# Patient Record
Sex: FEMALE | Race: BLACK OR AFRICAN AMERICAN | Employment: OTHER | URBAN - METROPOLITAN AREA
[De-identification: names, ages, dates, MRNs, and addresses within clinical notes are randomized per-mention and may not be internally consistent; named-entity substitution may affect disease eponyms.]

---

## 2020-11-05 ENCOUNTER — HOSPITAL ENCOUNTER (OUTPATIENT)
Dept: PREADMISSION TESTING | Age: 65
Discharge: HOME | End: 2020-11-05
Payer: MEDICARE

## 2020-11-05 PROCEDURE — 87635 SARS-COV-2 COVID-19 AMP PRB: CPT

## 2020-11-06 LAB — SARS-COV-2, COV2NT: NOT DETECTED

## 2020-11-10 ENCOUNTER — HOSPITAL ENCOUNTER (OUTPATIENT)
Age: 65
Setting detail: OUTPATIENT SURGERY
Discharge: HOME | End: 2020-11-10
Attending: INTERNAL MEDICINE | Admitting: INTERNAL MEDICINE
Payer: MEDICARE

## 2020-11-10 VITALS
BODY MASS INDEX: 29.53 KG/M2 | WEIGHT: 173 LBS | OXYGEN SATURATION: 99 % | SYSTOLIC BLOOD PRESSURE: 134 MMHG | TEMPERATURE: 97.3 F | RESPIRATION RATE: 16 BRPM | DIASTOLIC BLOOD PRESSURE: 64 MMHG | HEIGHT: 64 IN | HEART RATE: 76 BPM

## 2020-11-10 LAB — GLUCOSE BLD STRIP.AUTO-MCNC: 75 MG/DL (ref 70–110)

## 2020-11-10 PROCEDURE — 77030040361 HC SLV COMPR DVT MDII -B: Performed by: INTERNAL MEDICINE

## 2020-11-10 PROCEDURE — 82962 GLUCOSE BLOOD TEST: CPT

## 2020-11-10 PROCEDURE — G0500 MOD SEDAT ENDO SERVICE >5YRS: HCPCS | Performed by: INTERNAL MEDICINE

## 2020-11-10 PROCEDURE — 74011250636 HC RX REV CODE- 250/636: Performed by: INTERNAL MEDICINE

## 2020-11-10 PROCEDURE — 76040000007: Performed by: INTERNAL MEDICINE

## 2020-11-10 PROCEDURE — 2709999900 HC NON-CHARGEABLE SUPPLY: Performed by: INTERNAL MEDICINE

## 2020-11-10 RX ORDER — SIMVASTATIN 10 MG/1
TABLET, FILM COATED ORAL
COMMUNITY

## 2020-11-10 RX ORDER — ATROPINE SULFATE 0.1 MG/ML
0.5 INJECTION INTRAVENOUS
Status: CANCELLED | OUTPATIENT
Start: 2020-11-10 | End: 2020-11-11

## 2020-11-10 RX ORDER — FENTANYL CITRATE 50 UG/ML
100 INJECTION, SOLUTION INTRAMUSCULAR; INTRAVENOUS
Status: DISCONTINUED | OUTPATIENT
Start: 2020-11-10 | End: 2020-11-10 | Stop reason: HOSPADM

## 2020-11-10 RX ORDER — MIDAZOLAM HYDROCHLORIDE 1 MG/ML
.25-5 INJECTION, SOLUTION INTRAMUSCULAR; INTRAVENOUS
Status: DISCONTINUED | OUTPATIENT
Start: 2020-11-10 | End: 2020-11-10 | Stop reason: HOSPADM

## 2020-11-10 RX ORDER — METOPROLOL TARTRATE 50 MG/1
50 TABLET ORAL 2 TIMES DAILY
COMMUNITY

## 2020-11-10 RX ORDER — AMLODIPINE BESYLATE 10 MG/1
10 TABLET ORAL DAILY
COMMUNITY

## 2020-11-10 RX ORDER — DIPHENHYDRAMINE HYDROCHLORIDE 50 MG/ML
50 INJECTION, SOLUTION INTRAMUSCULAR; INTRAVENOUS ONCE
Status: CANCELLED | OUTPATIENT
Start: 2020-11-10 | End: 2020-11-10

## 2020-11-10 RX ORDER — SODIUM CHLORIDE 9 MG/ML
1000 INJECTION, SOLUTION INTRAVENOUS CONTINUOUS
Status: CANCELLED | OUTPATIENT
Start: 2020-11-10 | End: 2020-11-10

## 2020-11-10 RX ORDER — EPINEPHRINE 0.1 MG/ML
1 INJECTION INTRACARDIAC; INTRAVENOUS
Status: CANCELLED | OUTPATIENT
Start: 2020-11-10 | End: 2020-11-11

## 2020-11-10 RX ORDER — SODIUM CHLORIDE 0.9 % (FLUSH) 0.9 %
5-40 SYRINGE (ML) INJECTION AS NEEDED
Status: CANCELLED | OUTPATIENT
Start: 2020-11-10

## 2020-11-10 RX ORDER — FLUMAZENIL 0.1 MG/ML
0.2 INJECTION INTRAVENOUS
Status: DISCONTINUED | OUTPATIENT
Start: 2020-11-10 | End: 2020-11-10 | Stop reason: HOSPADM

## 2020-11-10 RX ORDER — NALOXONE HYDROCHLORIDE 0.4 MG/ML
0.4 INJECTION, SOLUTION INTRAMUSCULAR; INTRAVENOUS; SUBCUTANEOUS
Status: DISCONTINUED | OUTPATIENT
Start: 2020-11-10 | End: 2020-11-10 | Stop reason: HOSPADM

## 2020-11-10 RX ORDER — DEXTROMETHORPHAN/PSEUDOEPHED 2.5-7.5/.8
1.2 DROPS ORAL
Status: CANCELLED | OUTPATIENT
Start: 2020-11-10

## 2020-11-10 RX ORDER — SODIUM CHLORIDE 9 MG/ML
INJECTION, SOLUTION INTRAVENOUS
Status: DISCONTINUED | OUTPATIENT
Start: 2020-11-10 | End: 2020-11-10 | Stop reason: HOSPADM

## 2020-11-10 RX ORDER — SODIUM CHLORIDE 0.9 % (FLUSH) 0.9 %
5-40 SYRINGE (ML) INJECTION EVERY 8 HOURS
Status: CANCELLED | OUTPATIENT
Start: 2020-11-10

## 2020-11-10 NOTE — PROCEDURES
[facility]  Colonoscopy Procedure Report  _______________________________________________________  Patient: Garrett Negron                                        Attending Physician: Isha Washburn MD    Patient ID: [id number]                                    Referring Physician: En Lo MD    Exam Date: 11/10/2020      Introduction: A  76 y.o. female patient, presents for inpatient Colonoscopy    Indications: referred by Dr. Mayco Ca for anemia Hgb 9.7 on October 29, 2020 iron saturation 30%, retic count: 2.4. Vit M82 folic acid are normal. Last Colonoscopy was 6/3/2010. Small external hemorrhoids. Mildly tender was noted during rectal exam. Hypertrophied anal papillae found. Negative for abdominal pain. she has been constipated all her life. she has Bm  1x2 weekly. in the kast 2 years she has been taking Linzess 290 mcg daily which is helping. She had 2 negative colonoscopy. her last colonoscopy was done by my self on 6/3/ 2010 because of presence of occult blood in the stools. She received Versed 3 and Fentanyl 100 mcg. She had only a small hemorrhoids and hypertrophied anal papilla. no rectal bleeding, abdominal pain. so we are going to repeat her colonoscopy. I don't expect to find anything serious. She has chronic KD that has been progressing since 2015 and it is getting worse on august 13, 2020 BUN 15, Creatinine 1.90. Not investigated. she has been diabetic for 10 years. she denied taking NSAID's but later she told me she took Celebrex on regular basis since the 1990's and stopped only 2 months ago. in this case this could contribute to her CKD and possibly to the Anemia. She had lap hysterectomy      Consent: The benefits, risks, and alternatives to the procedure were discussed and informed consent was obtained from the patient. Preparation: EKG, pulse, pulse oximetry and blood pressure were monitored throughout the procedure. ASA Classification: Class II- .  The heart is an S1-S2 and regular heart rate and rhythm. Lungs are clear to auscultation and percussion. Abdomen is soft, nondistended, and nontender. Mental Status: awake, alert, and oriented to person, place, and time    Medications:  · Fentanyl 100 mcg IV before procedure. · Versed 5 mg IV throughout the procedure. Rectal Exam: Medium sized anorectal skin tags. Normal Rectal Exam. No Blood. Pathology Specimens:  0    Procedure: The colonoscope was passed with ease through the anus under direct visualization and advanced to the cecum and 5 cm inside the terminal ileum. The scope was withdrawn and the mucosa was carefully examined. The quality of the preparation was excellent. The views were excellent. The patient's toleration of the procedure was excellent. The exam was done twice to the cecum. Total time is 18 minutes and withdrawal time is 11 minutes. Findings:    Rectum:   Small internal hemorrhoids. One small hypertrophied anal papilla. Sigmoid:   normal   Descending Colon:   Normal   Transverse Colon:   Normal   Ascending Colon:   Normal   Cecum:   Normal   Terminal Ileum:   Normal      Unplanned Events: There were no unplanned events. Estimated Blood Loss: None  Impressions: Anorectal skin tags. Small internal hemorrhoids. One small hypertrophied anal papilla. Slightly tortuous sigmoid colon. Normal Mucosa. No blood, polyps or AVM found. Complications: None; patient tolerated the procedure well. Recommendations:  · Discharge home when standard parameters are met. · Resume a high fiber diet. · Resume own medications. Avoid all NSAID's for  · Colonoscopy recommendation in 10 years. · Most likely the anemia is related to chronic kidney disease?   · Take Colace and Miralax as needed to treat the constipation    Procedure Codes:    · COLONOSCOPY [FPV3742 (Type: Custom)]    Endoscope Information:  Model Number(s)    [id number]   Assistant: None  Signed By: Jessi Floyd MD Date: 11/10/2020

## 2020-11-10 NOTE — DISCHARGE INSTRUCTIONS
Zonia Ibarra  [id number]  1955    COLON DISCHARGE INSTRUCTIONS    Discomfort:  Redness at IV site- apply warm compress to area; if redness or soreness persist- contact your physician  There may be a slight amount of blood passed from the rectum  Gaseous discomfort- walking, belching will help relieve any discomfort  You may not operate a vehicle til the next day. You may not engage in an occupation involving machinery or appliances til the next day. You may not drink alcoholic beverages til the next day. DIET:   High fiber diet. ACTIVITY:  You may not  resume your normal daily activities til the next day. it is recommended that you spend the remainder of the day resting -  avoid any strenuous activity. CALL M.D.  IF ANY SIGN OF:   Increasing pain, nausea, vomiting  Abdominal distension (swelling)  New increased bleeding (oral or rectal)  Fever (chills)  Pain in chest area  Bloody discharge from nose or mouth  Shortness of breath    You may never  take any Advil, Aspirin, Ibuprofen, Motrin, Aleve, or Goodys ONLY  Tylenol as needed for pain. Post procedure diagnosis:  HEMORRHOIDS    Follow-up Instructions: Your follow up colonoscopy will be in 10 years. Eda Zhao MD  November 10, 2020       DISCHARGE SUMMARY from Nurse    PATIENT INSTRUCTIONS:    After general anesthesia or intravenous sedation, for 24 hours or while taking prescription Narcotics:  · Limit your activities  · Do not drive and operate hazardous machinery  · Do not make important personal or business decisions  · Do  not drink alcoholic beverages  · If you have not urinated within 8 hours after discharge, please contact your surgeon on call.     Report the following to your surgeon:  · Excessive pain, swelling, redness or odor of or around the surgical area  · Temperature over 100.5  · Nausea and vomiting lasting longer than 4 hours or if unable to take medications  · Any signs of decreased circulation or nerve impairment to extremity: change in color, persistent  numbness, tingling, coldness or increase pain  · Any questions    What to do at Home:  Recommended activity: Activity as tolerated and no driving for today. *  Please give a list of your current medications to your Primary Care Provider. *  Please update this list whenever your medications are discontinued, doses are      changed, or new medications (including over-the-counter products) are added. *  Please carry medication information at all times in case of emergency situations. These are general instructions for a healthy lifestyle:    No smoking/ No tobacco products/ Avoid exposure to second hand smoke  Surgeon General's Warning:  Quitting smoking now greatly reduces serious risk to your health. Obesity, smoking, and sedentary lifestyle greatly increases your risk for illness    A healthy diet, regular physical exercise & weight monitoring are important for maintaining a healthy lifestyle    You may be retaining fluid if you have a history of heart failure or if you experience any of the following symptoms:  Weight gain of 3 pounds or more overnight or 5 pounds in a week, increased swelling in our hands or feet or shortness of breath while lying flat in bed. Please call your doctor as soon as you notice any of these symptoms; do not wait until your next office visit. The discharge information has been reviewed with the patient and spouse. The patient and spouse verbalized understanding. Discharge medications reviewed with the patient and spouse and appropriate educational materials and side effects teaching were provided.   ______________________________________  Patient armband removed and shredded  _____________________________________________________________________________________________

## 2020-11-10 NOTE — H&P
Assessment/Plan  # Detail Type Description    1. Assessment Encounter for screening for malignant neoplasm of colon (Z12.11). Patient Plan 65 yrs. old female pt. referred by Dr. Makeda Walls for anemia. Last Colonoscopy was 6/3/2010. Collette Ruts Small external hemorrhoids. ,Mildly tender was noted during rectal exam. Hypertrophied anal papillae found. Negative for abdominal pain. she has been constipated all her life. she has Bm  1x2 weekly. in the kast 2 years she has been taking Linzess 290 mcg daily which is helping. She had 2 negative colonoscopy. her last colonoscopy was done by my self on 6/3/ 2010 because of presence of occult blood in the stools. She received Versed 3 and Fentanyl 100 mcg. She had only a small hemorrhoids and hypertrophied anal papilla. no rectal bleeding, abdominal pain. so we are going to repeat her colonoscopy. I don't expect to find anything serious. She has no family history of colon neoplasm. Will book for her third screening colonoscopy. I explained to the patient the procedure of colonoscopy and the risk involved which includes but not limited to bleeding, perforation, infection or missing a lesion if the bowels are not well clean or are unusually tortuous and difficult. I gave her the  Suprep. I  answered her questions. She was agreeable to proceed with this         2. Assessment Type 2 diabetes mellitus with hyperglycemia, without long-term current use of insulin (E11.65). Patient Plan she has been diabetic for about 10 years on insuline and trulucidity. lately Hgb a1c 5.1. She his poor historian, is hard hearing and have poor memories         3. Assessment Chronic kidney disease, unspecified (N18.9). Patient Plan chronic KD that has been progressing since 2015 and it is getting worse on august 13, 2020 BUN 15, Creatinine 1.90. Not investigated. she has been diabetic for 10 years.  she denied taking NSAID's but later she told me she took Celebrex on regular basis since the 1990's and stopped only 2 months ago. in this case this could contribute to her CKD and probably to the Anemia   I think for the sake of protecting her kidnies, there is no harm in reducing the dose of Omeprazole from 40 to 20 mg. I could not find the record of her previous EGD from 10 years ago. Told her never to touch any NSAID's         4. Assessment GERD without esophagitis (K21.9). Patient Plan chronic GERD has been on Omeprazole 40 mg daily for many years 20 years, no n/Vx dysphagia. she is had hearing and not a good historian  She had an EGD 10 years and was treated for the H Pylori  presently she is doing well and symptoms are well controlled with the Omeprazole. she lost 10 lbs in the last 2 years by working in the yard. She has poor appetite. she doesn't eat breakfast  I am not sure she needs the Omeprazole 40 mg. we may be able to reduce the dose to 20 mg         5. Assessment Anemia (D64.9). Patient Plan hgb on August 13, 2020 9.2 with normal MCV and MCH. denied taking NSAID's denied smoking or drinking alcohol. SHe had hysterectomy, hemithyroidectomy, C4-to 6 fusion. So we need to investigate this    Plan Orders CBC With Differential/Platelet to be performed., Iron and TIBC to be performed., Occult Blood, Fecal, IA to be performed. , Reticulocyte Count to be performed. Obtained on 10/29/2020 and Vitamin B12 and Folate to be performed. This 76year old  patient was referred by Maki Lopez. This 76year old female presents for Colon Cancer Screening. History of Present Illness:  1. Colon Cancer Screening   Prior screening:  colonoscopy. Denies risk factors. Pertinent negatives include abdominal pain, change in bowel habits, change in stool caliber, constipation, decreased appetite, diarrhea, melena, nausea, rectal bleeding, vomiting, weight gain and weight loss. Additional information: BM 1 x 2 daily.           PROBLEM LIST:     Problem Description Onset Date Chronic Clinical Status Notes   Menopausal symptom 10/02/2014 N     Type II diabetes mellitus w/o complication  Y     Sleep disorder 2011 N     Diabetic neuropathy 2011 N     Benign essential hypertension 2011 Y     Vitamin D deficiency 2011 Y     Gastroesophageal reflux disease 2011 Y     Degenerative joint disease involving multiple joints 2011 Y     Rheumatoid arthritis 2011 Y     Anemia 2011 Y     Backache 2014 N     Chondromalacia of patella 2015 N     H/O: hysterectomy  Y               PAST MEDICAL/SURGICAL HISTORY   (Detailed)    Disease/disorder Onset Date Management Date Comments   Primary open angle glaucoma [id number]        Arthroscopy knee 2014      Spinal Surgery       Thyroid Surgery     Fibroids  Hysterectomy, vaginal 2007      Breast Biopsy       Sinus Surgery       vaginal birth x1     Dizziness/Vertigo       Diabetes       Hypertension         GYNECOLOGIC HISTORY:  Patient is postmenopausal.   Type of menopause is hysterectomy . Family History  (Detailed)  Relationship Family Member Name  Age at Death Condition Onset Age Cause of Death   Brother    Diabetes mellitus  N   Brother  N  Cancer, lung  N   Father    Myocardial infarction  N   Father  N  Heart disease  N   Father  N  Cancer, prostate  N   Mother    Hypertension  N   Mother  N  Diabetes mellitus  N   Mother    Myocardial infarction  N   Mother  Y  Leukemia  Y   Mother  Y    N   Mother    Glaucoma  N   Mother  N  Heart disease  N   Sister  N  Cancer, lung  N         Social History:  (Detailed)  Tobacco use reviewed. Preferred language is [place]. Language spoken at home is [place]. The patient does not need an .     EDUCATION/EMPLOYMENT/OCCUPATION  Employment History Status Retired Restrictions   Source for [employer] STATUS/FAMILY/SOCIAL SUPPORT  Marital status:    Tobacco use status: Current non-smoker. Smoking status: Never smoker. TOBACCO SCREENING:  Patient has never used tobacco. Patient has not used tobacco in the last 30 days. Patient has not used smokeless tobacco in the last 30 days. SMOKING STATUS  Type Smoking Status Usage Per Day Years Used Pack Years Total Pack Years    Never smoker         TOBACCO CESSATION INFORMATION  Date Counseled By Order Status Description Code Tobacco Cessation Information   07/17/2013 [facility] Tobacco cessation counseling completed   Tobacco cessation counseling     TOBACCO/VAPING EXPOSURE  No passive smoke exposure. ALCOHOL  There is no history of alcohol use. CAFFEINE  The patient uses caffeine: soda - 2-6/day a day. LIFESTYLE  Never exercises.               Medications (active prior to today)  Medication Name Sig Description Start Date Stop Date Refilled Rx Elsewhere   EpiPen 0.3 mg/0.3 mL injection, auto-injector inject (0.3MG)  by INTRAMUSCULAR route onceas needed for anaphylaxis 04/25/2018 04/25/2018 N   promethazine 25 mg tablet take 1 by Oral route  every 8 hours 01/07/2020   N   meclizine 25 mg tablet take 1 tablet by oral route 3 times every day as needed 01/23/2020 01/23/2020 N   latanoprost 0.005 % eye drops instill 1 drop by ophthalmic route  every day into affected eye(s) in the evening 02/11/2020 02/11/2020 N   fluocinonide 0.05 % topical ointment apply by topical route 2 times every day to the affected area(s) 04/16/2020 04/16/2020 N   triamcinolone acetonide 0.1 % topical ointment apply by topical route 2 times every day a thin layer to the affected area(s) 04/16/2020 04/16/2020 N   Pen Needle 30 gauge x 5/16\" use for testing blood sugars daily 04/16/2020 04/16/2020 N   FreeStyle Test strips test tid by Subcutaneous route 04/16/2020 04/16/2020 N   omeprazole 40 mg capsule,delayed release take 1 capsule by oral route  every day before a meal 04/16/2020 04/16/2020 N   montelukast 10 mg tablet take 1 tablet by oral route every day in the evening 04/16/2020 04/16/2020 N   Linzess 290 mcg capsule take 1 capsule by oral route  every day on an empty stomach at least 30 minutes before 1st meal of the day 04/16/2020 04/16/2020 N   ropinirole 0.5 mg tablet take 1 tablet by ORAL route  every day 04/16/2020 25/76/9691 N   Trulicity 1.5 KR/9.1 mL subcutaneous pen injector INJECT 1 PEN (0.5 ML) UNDER THE SKIN EVERY WEEK IN THE ABDOMEN, THIGH OR UPPER ARM, ROTATING INJECTION SITES 04/16/2020 04/16/2020 N   hydralazine 50 mg tablet take 1 tablet by oral route 3 times every day with food 04/16/2020 04/16/2020 N   clonidine 0.3 mg/24 hr weekly transdermal patch apply 1 patch by transdermal route  every week 04/16/2020 04/16/2020 N   Novolog Flexpen U-100 Insulin aspart 100 unit/mL (3 mL) subcutaneous inject 5 by subcutaneous route before each meal 04/16/2020 04/16/2020 N   Lantus Solostar U-100 Insulin 100 unit/mL (3 mL) subcutaneous pen inject 60 by Subcutaneous route per insulin protocol 04/16/2020 04/16/2020 N   Norvasc 10 mg tablet take 1 tablet by oral route  every day 04/16/2020 04/16/2020 N   metoprolol tartrate 50 mg tablet take 1 tablet by ORAL route 2 times every day 04/16/2020 04/16/2020 N   magnesium 250 mg tablet take 1 tablet by oral route 2 times every day 04/16/2020 04/16/2020 N   clobetasol 0.05 % topical ointment apply by topical route 2 times every day a thin layer to the affected area(s) 04/30/2020   N   Neurontin 300 mg capsule take 1 capsule by ORAL route  every bedtime 06/16/2020 06/16/2020 N   mirtazapine 15 mg tablet take 0.5 - 2 tablet by oral route  every day before bedtime 08/07/2020   N   Edluar 10 mg sublingual tablet Dissolve 10 mg under tongue Once a Day.  12/20/2011   Y   dapsone 25 mg tablet take 3 tablet by oral route  every day 08/26/2020 08/26/2020 N   Zocor 20 mg tablet take 1 tablet by oral route  every day in the evening 09/30/2020 09/30/2020 N   duloxetine 30 mg capsule,delayed release take 1 capsule by oral route  every day 09/30/2020 09/30/2020 N     Medication Reconciliation  Medications reconciled today.   Medication Reviewed  Adherence Medication Name Sig Desc Elsewhere Status   taking as directed EpiPen 0.3 mg/0.3 mL injection, auto-injector inject (0.3MG)  by INTRAMUSCULAR route onceas needed for anaphylaxis N Verified   taking as directed promethazine 25 mg tablet take 1 by Oral route  every 8 hours N Verified   taking as directed meclizine 25 mg tablet take 1 tablet by oral route 3 times every day as needed N Verified   taking as directed latanoprost 0.005 % eye drops instill 1 drop by ophthalmic route  every day into affected eye(s) in the evening N Verified   taking as directed fluocinonide 0.05 % topical ointment apply by topical route 2 times every day to the affected area(s) N Verified   taking as directed triamcinolone acetonide 0.1 % topical ointment apply by topical route 2 times every day a thin layer to the affected area(s) N Verified   taking as directed Pen Needle 30 gauge x 5/16\" use for testing blood sugars daily N Verified   taking as directed FreeStyle Test strips test tid by Subcutaneous route N Verified   taking as directed omeprazole 40 mg capsule,delayed release take 1 capsule by oral route  every day before a meal N Verified   taking as directed Linzess 290 mcg capsule take 1 capsule by oral route  every day on an empty stomach at least 30 minutes before 1st meal of the day N Verified   taking as directed montelukast 10 mg tablet take 1 tablet by oral route  every day in the evening N Verified   taking as directed ropinirole 0.5 mg tablet take 1 tablet by ORAL route  every day N Verified   taking as directed Trulicity 1.5 JX/1.9 mL subcutaneous pen injector INJECT 1 PEN (0.5 ML) UNDER THE SKIN EVERY WEEK IN THE ABDOMEN, THIGH OR UPPER ARM, ROTATING INJECTION SITES N Verified   taking as directed hydralazine 50 mg tablet take 1 tablet by oral route 3 times every day with food N Verified   taking as directed clonidine 0.3 mg/24 hr weekly transdermal patch apply 1 patch by transdermal route  every week N Verified   taking as directed Novolog Flexpen U-100 Insulin aspart 100 unit/mL (3 mL) subcutaneous inject 5 by subcutaneous route before each meal N Verified   taking as directed Lantus Solostar U-100 Insulin 100 unit/mL (3 mL) subcutaneous pen inject 60 by Subcutaneous route per insulin protocol N Verified   taking as directed Norvasc 10 mg tablet take 1 tablet by oral route  every day N Verified   taking as directed metoprolol tartrate 50 mg tablet take 1 tablet by ORAL route 2 times every day N Verified   taking as directed magnesium 250 mg tablet take 1 tablet by oral route 2 times every day N Verified   taking as directed Neurontin 300 mg capsule take 1 capsule by ORAL route  every bedtime N Verified   taking as directed clobetasol 0.05 % topical ointment apply by topical route 2 times every day a thin layer to the affected area(s) N Verified   taking as directed mirtazapine 15 mg tablet take 0.5 - 2 tablet by oral route  every day before bedtime N Verified   taking as directed Edluar 10 mg sublingual tablet Dissolve 10 mg under tongue Once a Day.  Y Verified   taking as directed dapsone 25 mg tablet take 3 tablet by oral route  every day N Verified   taking as directed Zocor 20 mg tablet take 1 tablet by oral route  every day in the evening N Verified   taking as directed duloxetine 30 mg capsule,delayed release take 1 capsule by oral route  every day N Verified     Medications (Added, Continued or Stopped today)  Start Date Medication Directions PRN Status PRN Reason Instruction Stop Date   04/30/2020 clobetasol 0.05 % topical ointment apply by topical route 2 times every day a thin layer to the affected area(s) N      04/16/2020 clonidine 0.3 mg/24 hr weekly transdermal patch apply 1 patch by transdermal route  every week N      08/26/2020 dapsone 25 mg tablet take 3 tablet by oral route  every day N      09/30/2020 duloxetine 30 mg capsule,delayed release take 1 capsule by oral route  every day N      12/20/2011 Edluar 10 mg sublingual tablet Dissolve 10 mg under tongue Once a Day.  N      04/25/2018 EpiPen 0.3 mg/0.3 mL injection, auto-injector inject (0.3MG)  by INTRAMUSCULAR route onceas needed for anaphylaxis N      04/16/2020 fluocinonide 0.05 % topical ointment apply by topical route 2 times every day to the affected area(s) N      04/16/2020 FreeStyle Test strips test tid by Subcutaneous route N      10/29/2020 GaviLyte-N 420 gram oral solution take as prescribed by physician N      04/16/2020 hydralazine 50 mg tablet take 1 tablet by oral route 3 times every day with food N      04/16/2020 Lantus Solostar U-100 Insulin 100 unit/mL (3 mL) subcutaneous pen inject 60 by Subcutaneous route per insulin protocol N      02/11/2020 latanoprost 0.005 % eye drops instill 1 drop by ophthalmic route  every day into affected eye(s) in the evening N      04/16/2020 Linzess 290 mcg capsule take 1 capsule by oral route  every day on an empty stomach at least 30 minutes before 1st meal of the day N      04/16/2020 magnesium 250 mg tablet take 1 tablet by oral route 2 times every day N      01/23/2020 meclizine 25 mg tablet take 1 tablet by oral route 3 times every day as needed N      04/16/2020 metoprolol tartrate 50 mg tablet take 1 tablet by ORAL route 2 times every day N      08/07/2020 mirtazapine 15 mg tablet take 0.5 - 2 tablet by oral route  every day before bedtime N      04/16/2020 montelukast 10 mg tablet take 1 tablet by oral route  every day in the evening N      06/16/2020 Neurontin 300 mg capsule take 1 capsule by ORAL route  every bedtime N      04/16/2020 Norvasc 10 mg tablet take 1 tablet by oral route  every day N      04/16/2020 Novolog Flexpen U-100 Insulin aspart 100 unit/mL (3 mL) subcutaneous inject 5 by subcutaneous route before each meal N      04/16/2020 omeprazole 40 mg capsule,delayed release take 1 capsule by oral route  every day before a meal N      04/16/2020 Pen Needle 30 gauge x 5/16\" use for testing blood sugars daily N  E11.65    01/07/2020 promethazine 25 mg tablet take 1 by Oral route  every 8 hours N      04/16/2020 ropinirole 0.5 mg tablet take 1 tablet by ORAL route  every day N      04/16/2020 triamcinolone acetonide 0.1 % topical ointment apply by topical route 2 times every day a thin layer to the affected area(s) N      29/36/7570 Trulicity 1.5 RS/9.2 mL subcutaneous pen injector INJECT 1 PEN (0.5 ML) UNDER THE SKIN EVERY WEEK IN THE ABDOMEN, THIGH OR UPPER ARM, ROTATING INJECTION SITES N      09/30/2020 Zocor 20 mg tablet take 1 tablet by oral route  every day in the evening N        Allergies:  Ingredient Reaction (Severity) Medication Name Comment   ACE INHIBITORS      ACETAMINOPHEN  Percocet    AVOCADO EXTRACT swelling (Unknown)     NAPROXEN SODIUM  Aleve    OXYCODONE HCL  Percocet    PEANUT      PINEAPPLE JUICE swelling (Unknown)     RICE STARCH swelling (Unknown)     Reviewed, updated. ORDERS:  Status Lab Order Time Frame Comments   ordered CBC With Differential/Platelet     ordered Occult Blood, Fecal, IA     ordered Vitamin B12 and Folate     ordered Iron and TIBC     ordered Reticulocyte Count       Review of System  System Neg/Pos Details   Constitutional Negative Chills, Fever, Malaise, Weight gain and Weight loss. ENMT Negative Ear infections, Nasal congestion, Sinus Infection and Sore throat. Eyes Negative Double vision and Eye pain. Respiratory Negative Asthma, Chronic cough, Dyspnea, Pleuritic pain and Wheezing. Cardio Negative Chest pain, Edema and Irregular heartbeat/palpitations. GI Negative Abdominal pain, Change in bowel habits, Change in stool caliber, Constipation, Decreased appetite, Diarrhea, Dysphagia, Heartburn, Hematemesis, Hematochezia, Melena, Nausea, Rectal bleeding, Reflux and Vomiting.     Negative Dysuria, Hematuria, Urinary frequency, Urinary incontinence and Urinary retention. Endocrine Negative Cold intolerance, Heat intolerance and Increased thirst.   Neuro Negative Dizziness, Headache, Numbness, Tremors and Vertigo. Psych Negative Anxiety, Depression and Increased stress. Integumentary Negative Hives, Pruritus and Rash. MS Negative Back pain, Joint pain and Myalgia. Hema/Lymph Negative Easy bleeding, Easy bruising and Lymphadenopathy. Allergic/Immuno Negative Chemicals in work place, Contact allergy, Food allergies, Immunosuppression and Seasonal allergies. Reproductive Positive The patient is post-menopausal (The menopause was hysterectomy). Reproductive Negative Breast lumps, Breast pain and Vaginal discharge. Vital Signs   Gynecologic History  Patient is postmenopausal.    Height  Time ft in cm Last Measured Height Position   8:44 AM 5.0 4.00 162.56 10/29/2020      MAP (Calculated)  Arterial Line 1 BP (mmHg)  BP Patient Position  Resp  SpO2  O2 Device  O2 Flow Rate (L/min)  Pre/Post Ductal  Weight        11/10/20 0757  98.3 °F (36.8 °C)  74  125/62  83      16  96 %  Room air      78.5 kg (173 lb)        PHYSICAL EXAM:  Exam Findings Details   Constitutional Normal No acute distress. Well Nourished. Well developed. Eyes Normal General - Right: Normal, Left: Normal. Conjunctiva - Right: Normal, Left: Normal. Sclera - Right: Normal, Left: Normal. Cornea - Right: Normal, Left: Normal. Pupil - Right: Normal, Left: Normal.   Nose/Mouth/Throat Normal Lips/teeth/gums - Normal. Tongue - Normal. Buccal mucosa - Normal. Palate & uvula - Normal.   Neck Exam Normal Inspection - Normal. Palpation - Normal. Thyroid gland - Normal. Cervical lymph nodes - Normal.   Respiratory Normal Inspection - Normal. Auscultation - Normal. Percussion - Normal. Cough - Absent. Effort - Normal.   Cardiovascular Normal Heart rate - Regular rate. Heart sounds - Normal S1, Normal S2. Murmurs - None. Extremities - No edema. Abdomen Normal Inspection - Normal. Appliance(s) - None. Abdominal muscles - Normal. Auscultation - Normal. Percussion - Normal. Anterior palpation - Normal, No guarding, No rebound. CVA tenderness - None. Umbilicus - Normal. Abdominal reflexes - Normal. No abdominal tenderness. No hepatic enlargement. No splenic enlargement. No hernia. No Ascites. No palpable mass. Godoy's sign - Negative. Skin Normal Inspection - Normal.   Musculoskeletal Normal Hands - Left: Normal, Right: Normal.   Extremity Normal No cyanosis. No edema. Clubbing - Absent. Neurological Normal Level of consciousness - Normal. Orientation - Normal. Memory - Normal. Motor - Normal. Balance & gait - Normal. Coordination - Normal. Fine motor skills - Normal. DTRs - Normal.   Psychiatric * Deficient fund of knowledge, Awareness of current event: fair, Awareness of past history: fair, Vocabulary: normal.   Psychiatric Normal Orientation - Oriented to time, place, person & situation. Not anxious. Appropriate mood and affect. Behavior is appropriate for age. Sufficient language. No memory loss.        No change in H&P

## 2021-06-08 PROBLEM — N39.46 MIXED INCONTINENCE: Status: ACTIVE | Noted: 2021-06-08

## 2021-06-09 PROBLEM — N95.2 POSTMENOPAUSAL ATROPHIC VAGINITIS: Status: ACTIVE | Noted: 2021-06-09

## 2021-06-09 PROBLEM — R35.1 NOCTURIA: Status: ACTIVE | Noted: 2021-06-09

## 2021-06-09 PROBLEM — N32.81 OAB (OVERACTIVE BLADDER): Status: ACTIVE | Noted: 2021-06-09

## 2022-01-13 ENCOUNTER — HOSPITAL ENCOUNTER (OUTPATIENT)
Dept: LAB | Age: 67
Discharge: HOME | End: 2022-01-13
Payer: MEDICARE

## 2022-01-13 PROCEDURE — U0003 INFECTIOUS AGENT DETECTION BY NUCLEIC ACID (DNA OR RNA); SEVERE ACUTE RESPIRATORY SYNDROME CORONAVIRUS 2 (SARS-COV-2) (CORONAVIRUS DISEASE [COVID-19]), AMPLIFIED PROBE TECHNIQUE, MAKING USE OF HIGH THROUGHPUT TECHNOLOGIES AS DESCRIBED BY CMS-2020-01-R: HCPCS

## 2022-01-14 LAB — SARS-COV-2, NAA: NOT DETECTED

## (undated) DEVICE — SPONGE GZ W4XL4IN COT 12 PLY TYP VII WVN C FLD DSGN

## (undated) DEVICE — SINGLE PORT MANIFOLD: Brand: NEPTUNE 2

## (undated) DEVICE — STRAP,POSITIONING,KNEE/BODY,FOAM,4X60": Brand: MEDLINE

## (undated) DEVICE — KENDALL RADIOLUCENT FOAM MONITORING ELECTRODE RECTANGULAR SHAPE: Brand: KENDALL

## (undated) DEVICE — CATH IV SAFE STR 22GX1IN BLU -- PROTECTIV PLUS

## (undated) DEVICE — GARMENT,MEDLINE,DVT,INT,CALF,MED, GEN2: Brand: MEDLINE

## (undated) DEVICE — TRAP SPEC COLL POLYP POLYSTYR --

## (undated) DEVICE — WRISTBAND ID AD W2.5XL9.5CM RED VYN ADH CLSR UNI-PRINT

## (undated) DEVICE — CATH SUC CTRL PRT TRIFLO 14FR --

## (undated) DEVICE — SET ADMIN 16ML TBNG L100IN 2 Y INJ SITE IV PIGGY BK DISP

## (undated) DEVICE — SYR 5ML 1/5 GRAD LL NSAF LF --

## (undated) DEVICE — SOLUTION IV 500ML 0.9% SOD CHL FLX CONT

## (undated) DEVICE — CANNULA CUSH AD W/ 14FT TBG

## (undated) DEVICE — SYRINGE 50ML E/T

## (undated) DEVICE — SYR 3ML LL TIP 1/10ML GRAD --

## (undated) DEVICE — SPONGE GZ W4XL4IN RAYON POLY 4 PLY NONWOVEN FASTER WICKING

## (undated) DEVICE — NDL PRT INJ NSAF BLNT 18GX1.5 --

## (undated) DEVICE — TRNQT TEXT 1X18IN BLU LF DISP -- CONVERT TO ITEM 362165

## (undated) DEVICE — NDL FLTR TIP 5 MIC 18GX1.5IN --

## (undated) DEVICE — TUBING, SUCTION, 1/4" X 12', STRAIGHT: Brand: MEDLINE

## (undated) DEVICE — MAJ-1414 SINGLE USE ADPATER BIOPSY VALV: Brand: SINGLE USE ADAPTOR BIOPSY VALVE